# Patient Record
Sex: MALE | Race: WHITE | NOT HISPANIC OR LATINO | URBAN - METROPOLITAN AREA
[De-identification: names, ages, dates, MRNs, and addresses within clinical notes are randomized per-mention and may not be internally consistent; named-entity substitution may affect disease eponyms.]

---

## 2021-05-05 ENCOUNTER — OFFICE VISIT (OUTPATIENT)
Dept: URBAN - METROPOLITAN AREA CLINIC 31 | Facility: CLINIC | Age: 51
End: 2021-05-05

## 2021-05-05 RX ORDER — LORAZEPAM 1 MG/1
1 TABLET AT BEDTIME AS NEEDED TABLET ORAL ONCE A DAY
Status: ACTIVE | COMMUNITY

## 2021-05-05 RX ORDER — METOPROLOL TARTRATE 50 MG/1
1 TABLET WITH FOOD TABLET, FILM COATED ORAL TWICE A DAY
Qty: 60 | Status: ACTIVE | COMMUNITY

## 2021-05-05 RX ORDER — ESCITALOPRAM 20 MG/1
1 TABLET TABLET, FILM COATED ORAL ONCE A DAY
Qty: 30 | Status: ACTIVE | COMMUNITY

## 2021-05-05 RX ORDER — FLUPHENAZINE HYDROCHLORIDE 2.5 MG/1
1 TABLET TABLET, FILM COATED ORAL ONCE A DAY
Qty: 30 | Status: ACTIVE | COMMUNITY

## 2021-05-05 RX ORDER — VENLAFAXINE HYDROCHLORIDE 75 MG/1
1 TABLET WITH FOOD TABLET ORAL ONCE A DAY
Qty: 30 | Status: ACTIVE | COMMUNITY

## 2021-05-05 RX ORDER — CLONAZEPAM 1 MG/1
1 TABLET TABLET ORAL ONCE A DAY
Status: ACTIVE | COMMUNITY

## 2021-05-05 NOTE — HPI-MIGRATED HPI
;     Colorectal Cancer Screening : 51 y/o male patient presents today for a consultation for a colorectal cancer screening. Patient admits this is his/her first colonoscopy due to age.  Patient currently admits __ bowel movements per day with no melena, mucus  or blood in stools.  Patient denies/admits abdominal pain, bloating, gas, or  heartburn.  Patient denies having a colonoscopy/EGD in --- by  ---- with --  findings.  Patient denies/admits a family hx of colon, gastric, or esophageal cancer/polyps.  ;

## 2021-05-26 ENCOUNTER — OFFICE VISIT (OUTPATIENT)
Dept: URBAN - METROPOLITAN AREA CLINIC 31 | Facility: CLINIC | Age: 51
End: 2021-05-26

## 2021-05-26 NOTE — HPI-MIGRATED HPI
;     Colorectal Cancer Screening : 50 y/o male patient presents today for a consultation for a colorectal cancer screening. Patient admits this is his/her first colonoscopy due to age.  Patient currently admits __ bowel movements per day with no melena, mucus  or blood in stools.  Patient denies/admits abdominal pain, bloating, gas, or  heartburn.  Patient denies having a colonoscopy/EGD in --- by  ---- with --  findings.  Patient denies/admits a family hx of colon, gastric, or esophageal cancer/polyps.;